# Patient Record
Sex: MALE | Race: ASIAN | URBAN - NONMETROPOLITAN AREA
[De-identification: names, ages, dates, MRNs, and addresses within clinical notes are randomized per-mention and may not be internally consistent; named-entity substitution may affect disease eponyms.]

---

## 2022-12-05 ENCOUNTER — APPOINTMENT (OUTPATIENT)
Dept: URBAN - NONMETROPOLITAN AREA CLINIC 45 | Age: 37
Setting detail: DERMATOLOGY
End: 2022-12-06

## 2022-12-05 DIAGNOSIS — L663 OTHER SPECIFIED DISEASES OF HAIR AND HAIR FOLLICLES: ICD-10-CM

## 2022-12-05 DIAGNOSIS — D22 MELANOCYTIC NEVI: ICD-10-CM

## 2022-12-05 DIAGNOSIS — R21 RASH AND OTHER NONSPECIFIC SKIN ERUPTION: ICD-10-CM

## 2022-12-05 DIAGNOSIS — L738 OTHER SPECIFIED DISEASES OF HAIR AND HAIR FOLLICLES: ICD-10-CM

## 2022-12-05 PROBLEM — D22.5 MELANOCYTIC NEVI OF TRUNK: Status: ACTIVE | Noted: 2022-12-05

## 2022-12-05 PROBLEM — L02.821 FURUNCLE OF HEAD [ANY PART, EXCEPT FACE]: Status: ACTIVE | Noted: 2022-12-05

## 2022-12-05 PROCEDURE — OTHER ADDITIONAL NOTES: OTHER

## 2022-12-05 PROCEDURE — OTHER COUNSELING: OTHER

## 2022-12-05 PROCEDURE — OTHER OTC TREATMENT REGIMEN: OTHER

## 2022-12-05 PROCEDURE — 99204 OFFICE O/P NEW MOD 45 MIN: CPT

## 2022-12-05 PROCEDURE — OTHER MIPS QUALITY: OTHER

## 2022-12-05 PROCEDURE — OTHER DIAGNOSIS COMMENT: OTHER

## 2022-12-05 PROCEDURE — OTHER MONITORING: OTHER

## 2022-12-05 PROCEDURE — OTHER TREATMENT REGIMEN: OTHER

## 2022-12-05 ASSESSMENT — LOCATION SIMPLE DESCRIPTION DERM
LOCATION SIMPLE: POSTERIOR SCALP
LOCATION SIMPLE: RIGHT UPPER ARM
LOCATION SIMPLE: LEFT UPPER ARM
LOCATION SIMPLE: LEFT UPPER ARM
LOCATION SIMPLE: PENIS

## 2022-12-05 ASSESSMENT — LOCATION DETAILED DESCRIPTION DERM
LOCATION DETAILED: RIGHT ANTERIOR DISTAL UPPER ARM
LOCATION DETAILED: DORSAL GLANS
LOCATION DETAILED: POSTERIOR MID-PARIETAL SCALP
LOCATION DETAILED: LEFT ANTERIOR DISTAL UPPER ARM
LOCATION DETAILED: LEFT ANTERIOR DISTAL UPPER ARM

## 2022-12-05 ASSESSMENT — LOCATION ZONE DERM
LOCATION ZONE: SCALP
LOCATION ZONE: ARM
LOCATION ZONE: PENIS
LOCATION ZONE: ARM

## 2022-12-05 NOTE — PROCEDURE: TREATMENT REGIMEN
Detail Level: Zone
Plan: Bacterial Culture W/ Sensitivity done  today in clinic\\nDeclined topical or oral antibiotics at this time.

## 2022-12-05 NOTE — HPI: SKIN LESIONS
Have Your Skin Lesions Been Treated?: not been treated
Is This A New Presentation, Or A Follow-Up?: Skin Lesions
Additional History: Dark marks on tip of penis for years. Patient has not noticed any changes. They are not symptomatic.

## 2022-12-05 NOTE — PROCEDURE: DIAGNOSIS COMMENT
Render Risk Assessment In Note?: no
Comment: No hair loss, minimal erythema. Less than a dozen 1-3mm pustules.
Detail Level: Simple

## 2022-12-05 NOTE — PROCEDURE: OTC TREATMENT REGIMEN
Detail Level: Zone
Patient Specific Otc Recommendations (Will Not Stick From Patient To Patient): Allegra, Zyrtec or Claritin qd x 6 weeks. If still getting lesions, consider biopsy.

## 2022-12-05 NOTE — PROCEDURE: MONITORING
Detail Level: Detailed
Patient Specific Counseling (Will Not Stick From Patient To Patient): Monitor for changes.

## 2023-01-03 ENCOUNTER — RX ONLY (RX ONLY)
Age: 38
End: 2023-01-03

## 2023-01-03 RX ORDER — KETOCONAZOLE 20 MG/ML
SHAMPOO, SUSPENSION TOPICAL
Qty: 120 | Refills: 2 | Status: ERX | COMMUNITY
Start: 2023-01-03

## 2023-01-03 RX ORDER — GENTAMICIN SULFATE 1 MG/G
CREAM TOPICAL
Qty: 15 | Refills: 1 | Status: ERX | COMMUNITY
Start: 2023-01-03

## 2023-05-16 ENCOUNTER — OFFICE VISIT (OUTPATIENT)
Dept: URBAN - METROPOLITAN AREA CLINIC 27 | Facility: CLINIC | Age: 38
End: 2023-05-16

## 2023-06-08 ENCOUNTER — OFFICE VISIT (OUTPATIENT)
Dept: URBAN - METROPOLITAN AREA CLINIC 78 | Facility: CLINIC | Age: 38
End: 2023-06-08
Payer: COMMERCIAL

## 2023-06-08 VITALS
TEMPERATURE: 97.8 F | HEIGHT: 71 IN | BODY MASS INDEX: 21.14 KG/M2 | DIASTOLIC BLOOD PRESSURE: 77 MMHG | WEIGHT: 151 LBS | HEART RATE: 59 BPM | SYSTOLIC BLOOD PRESSURE: 127 MMHG

## 2023-06-08 DIAGNOSIS — K21.9 CHRONIC GERD: ICD-10-CM

## 2023-06-08 DIAGNOSIS — K22.70 BARRETT'S ESOPHAGUS WITHOUT DYSPLASIA: ICD-10-CM

## 2023-06-08 DIAGNOSIS — K44.9 HIATAL HERNIA: ICD-10-CM

## 2023-06-08 PROCEDURE — 99204 OFFICE O/P NEW MOD 45 MIN: CPT | Performed by: INTERNAL MEDICINE

## 2023-06-08 RX ORDER — ALUMINUM ZIRCONIUM TRICHLOROHYDREX GLY 0.19 G/G
1 TABLET 30 MINUTES BEFORE MORNING MEAL STICK TOPICAL ONCE A DAY
Status: ACTIVE | COMMUNITY

## 2023-06-08 NOTE — PREVIOUS WORKUP REVIEWED
. ENDOSCOPIES -EGD 4/27/2018: LA grade a esophagitis.  Small hiatal hernia.  Bilious fluid in the gastric body.  Otherwise normal.  Normal duodenum. *Pathology: Gastric-mild chronic gastritis.  Negative H. pylori infection.  GE junction-reflux esophagitis.  Negative for intestinal metaplasia. -EGD 9/6/2016: Small hiatal hernia.  A superficial esophageal ulcer in the distal esophagus.  LA grade a esophagitis.  Normal stomach.  Normal duodenum. *Pathology: GE junction-squamous basal hyperplasia, elongation of the vascular papillae.  Chronic inflammation.  Consistent with reflux esophagitis.  Negative for intestinal metaplasia. *Pathology 7/10/2014: Esophagus-benign squamous and gastric tissue, negative for goblet cell metaplasia. -EGD 5/16/2014: Irregular Z-line.  Prominence at GE junction 40 cm.  Diffuse mildly erythematous mucosa in the gastric antrum and body.  Normal duodenum. *Pathology: Gastric antrum-mild chronic gastritis, negative for H. pylori.  Gastric body-mild chronic gastritis, negative for H. pylori.  GE junction prominence-intestinal metaplasia.  Negative for dysplasia.  GE junction-reflux changes.  Negative for intestinal metaplasia. -EGD 4/24/2013: A 6 mm polyp in the distal esophagus.  Normal stomach.  Normal duodenum. *Pathology: Esophageal polyp-columnar mucosa with chronic inflammation and hyperplastic changes.  Negative for intestinal metaplasia.  No dysplasia. -EGD 2/6/2013: A 20 mm polyp in the distal esophagus.  Normal stomach.  Normal duodenum. *Pathology: GE junction-polypoid change.  Marked active chronic inflammation.  Positive for extensive intestinal metaplasia.  Indefinite for dysplasia. LABS  IMAGES

## 2023-06-08 NOTE — HPI-TODAY'S VISIT:
37-year-old male presents for uncontrolled reflux symptoms.  Started earlier this year.  Heartburn and pain in the left upper quadrant.  He had EGD done with outside [facility].  About a year ago.  Dr. Bruce Lynne Currently he takes brand Prilosec over-the-counter 40-80 mg once daily. In the past he tried Protonix, Dexilant, Nexium, Pepcid, but all were inferior to original Prilosec. He is interested in getting surgical options including TIF, fundoplication, LINX. He also reports a rubbery consistency external hemorrhoids likely thrombosed.  He showed me a picture.  Now it has gotten smaller and going away.

## 2023-06-22 ENCOUNTER — DASHBOARD ENCOUNTERS (OUTPATIENT)
Age: 38
End: 2023-06-22

## 2023-06-22 ENCOUNTER — TELEPHONE ENCOUNTER (OUTPATIENT)
Dept: URBAN - METROPOLITAN AREA CLINIC 78 | Facility: CLINIC | Age: 38
End: 2023-06-22

## 2023-06-22 PROBLEM — 235595009: Status: ACTIVE | Noted: 2023-06-22

## 2023-06-23 ENCOUNTER — WEB ENCOUNTER (OUTPATIENT)
Dept: URBAN - METROPOLITAN AREA CLINIC 78 | Facility: CLINIC | Age: 38
End: 2023-06-23

## 2023-06-28 ENCOUNTER — WEB ENCOUNTER (OUTPATIENT)
Dept: URBAN - METROPOLITAN AREA CLINIC 78 | Facility: CLINIC | Age: 38
End: 2023-06-28

## 2023-07-22 LAB — H PYLORI BREATH TEST: NEGATIVE
